# Patient Record
Sex: MALE | URBAN - METROPOLITAN AREA
[De-identification: names, ages, dates, MRNs, and addresses within clinical notes are randomized per-mention and may not be internally consistent; named-entity substitution may affect disease eponyms.]

---

## 2018-01-02 ENCOUNTER — TRANSFERRED RECORDS (OUTPATIENT)
Dept: HEALTH INFORMATION MANAGEMENT | Facility: CLINIC | Age: 23
End: 2018-01-02

## 2018-01-11 ENCOUNTER — OFFICE VISIT (OUTPATIENT)
Dept: PHARMACY | Facility: CLINIC | Age: 23
End: 2018-01-11
Payer: COMMERCIAL

## 2018-01-11 ENCOUNTER — DOCUMENTATION ONLY (OUTPATIENT)
Dept: INFECTIOUS DISEASES | Facility: CLINIC | Age: 23
End: 2018-01-11

## 2018-01-11 DIAGNOSIS — Z21 HUMAN IMMUNODEFICIENCY VIRUS I INFECTION (H): Primary | ICD-10-CM

## 2018-01-11 PROCEDURE — 99207 ZZC NO CHARGE LOS: CPT | Performed by: PHARMACIST

## 2018-01-11 NOTE — MR AVS SNAPSHOT
"              After Visit Summary   2018    Jason Gramajo    MRN: [id number]           Patient Information     Date Of Birth          1995        Visit Information        Provider Department      2018 3:30 PM Mya Maldonado RPH [facility] Infectious Diseases [facility]        Today's Diagnoses     Human immunodeficiency virus I infection (H)    -  1       Follow-ups after your visit        Who to contact     If you have questions or need follow up information about today's clinic visit or your schedule please contact [facility] AND INFECTIOUS DISEASES [facility] directly at [phone].  Normal or non-critical lab and imaging results will be communicated to you by MyChart, letter or phone within 4 business days after the clinic has received the results. If you do not hear from us within 7 days, please contact the clinic through JumpSofthart or phone. If you have a critical or abnormal lab result, we will notify you by phone as soon as possible.  Submit refill requests through Shipu or call your pharmacy and they will forward the refill request to us. Please allow 3 business days for your refill to be completed.          Additional Information About Your Visit        MyChart Information     Shipu lets you send messages to your doctor, view your test results, renew your prescriptions, schedule appointments and more. To sign up, go to [email or web address] . Click on \"Log in\" on the left side of the screen, which will take you to the Welcome page. Then click on \"Sign up Now\" on the right side of the page.     You will be asked to enter the access code listed below, as well as some personal information. Please follow the directions to create your username and password.     Your access code is: [email or web address]  Expires: 2018  6:30 AM     Your access code will  in 90 days. If you need help or a new code, please call your [place] clinic or [phone].        Care EveryWhere ID "     This is your Care EveryWhere ID. This could be used by other organizations to access your [facility] medical records  [id number]         Blood Pressure from Last 3 Encounters:   No data found for BP    Weight from Last 3 Encounters:   No data found for Wt              Today, you had the following     No orders found for display         Today's Medication Changes          These changes are accurate as of: 1/11/18 11:59 PM.  If you have any questions, ask your nurse or doctor.               Start taking these medicines.        Dose/Directions    abacavir-dolutegravir-LamiVUDine 600- MG per tablet   Commonly known as:  TRIUMEQ   Used for:  Human immunodeficiency virus I infection (H)   Started by:  Mya Maldonado RPH        Dose:  1 tablet   Take 1 tablet by mouth daily   Quantity:  30 tablet   Refills:  3       escitalopram 20 MG tablet   Commonly known as:  LEXAPRO   Started by:  Mya Maldonado RPH        Dose:  10 mg   Take 0.5 tablets (10 mg) by mouth daily   Quantity:  15 tablet   Refills:  3            Where to get your medicines      These medications were sent to [facility] [address] [place] [address] [address] [place] [id number]    Hours:  TRANSPLANT PHONE NUMBER [phone] Phone:  [phone]     abacavir-dolutegravir-LamiVUDine 600- MG per tablet    escitalopram 20 MG tablet                Primary Care Provider Office Phone # Fax #    Elaina Garcia [phone]       [facility] [address]  [facility] [address]        Equal Access to Services     JENNIFER CARLSON AH: Hadii aad ku hadasho Soomaali, waaxda luqadaha, qaybta kaalmada adeegyada, awa adams. So [facility] [phone].    ATENCIÓN: Si habla español, tiene a dennis disposición servicios gratuitos de asistencia lingüística. Llame al [phone].    We comply with applicable federal civil rights laws and [place] laws. We do not  discriminate on the basis of race, color, national origin, age, disability, sex, sexual orientation, or gender identity.            Thank you!     Thank you for choosing [facility] AND INFECTIOUS DISEASES [place]  for your care. Our goal is always to provide you with excellent care. Hearing back from our patients is one way we can continue to improve our services. Please take a few minutes to complete the written survey that you may receive in the mail after your visit with us. Thank you!             Your Updated Medication List - Protect others around you: Learn how to safely use, store and throw away your medicines at www.disposemymeds.org.          This list is accurate as of: 1/11/18 11:59 PM.  Always use your most recent med list.                   Brand Name Dispense Instructions for use Diagnosis    abacavir-dolutegravir-LamiVUDine 600- MG per tablet    TRIUMEQ    30 tablet    Take 1 tablet by mouth daily    Human immunodeficiency virus I infection (H)       escitalopram 20 MG tablet    LEXAPRO    15 tablet    Take 0.5 tablets (10 mg) by mouth daily

## 2018-01-12 RX ORDER — ESCITALOPRAM OXALATE 20 MG/1
10 TABLET ORAL DAILY
Qty: 15 TABLET | Refills: 3 | Status: SHIPPED | OUTPATIENT
Start: 2018-01-12

## 2018-01-17 NOTE — PROGRESS NOTES
Clinical Consult:                                                    Jason Gramajo is a 22 year old male coming in for a clinical pharmacist consult.  He was referred to me from Eric Ruiz. Pt arrived late to appointment, so we will not have time for a full MTM visit.     Reason for Consult: New med start.    Discussion: Jason reports he was diagnosed with HIV and wants to start medication as soon as possible. Reports Dr. Rodas prescribed Triumeq and he wants to know the best time to take it and what side effects he should be aware of.    Plan:  1. Reviewed dosing, what to do if a dose is missed, the importance of strict medication adherence, and possible side effects.  2. Pt was supplied with a key chain, pill vega.   3. Ok per Dr. Bergeron to order Triumeq once daily and Lexapo 20 mg, to take a 1/2 tablet daily, with 3 refills each.     Pt is asked to call with any questions/concerns.    Mya Maldonado, [facility] Pharmacist.   [phone]

## 2018-01-23 ENCOUNTER — TELEPHONE (OUTPATIENT)
Dept: PHARMACY | Facility: CLINIC | Age: 23
End: 2018-01-23

## 2018-01-23 RX ORDER — CHOLECALCIFEROL (VITAMIN D3) 50 MCG
2000 TABLET ORAL DAILY
Qty: 100 TABLET | Refills: 3 | COMMUNITY
Start: 2018-01-23

## 2018-01-23 NOTE — TELEPHONE ENCOUNTER
Clinical Consult:                                                    Jason Gramajo is a 22 year old male called for a clinical pharmacist consult.     Reason for Consult: Nausea symptoms    Discussion: Jason reports since starting Triumeq, he has been feeling a little nauseated. Reports he is taking Triumeq, Lexapro and Vit D all at the same time, after some food, about 5-6 pm. Reports on Friday he took meds and 15 minutes later vomited. Reports he called the pharmacy and they recommended he take another dose. Reports one missed dose since starting Triumeq on 01/12/18.    Plan:  1. Suggested he take Triumeq after food at 5-6pm and take Lexapro and Vit D at bedtime with a snack.    He is asked to call me on Friday to follow up.    Mya Maldonado, [facility] Pharmacist.   [phone]

## 2018-02-04 NOTE — PROGRESS NOTES
Mr. Jason Gramajo referred by [place] [facility] for a Social Work introduction and intake evaluation by the [facility] pharmacist today.  He lacks effective health insurance but wished to get started on antiretroviral therapy as soon as possible, so I spoke to him briefly today so he can receive a prescription with appropriate ARV initiation counseling by the [facility] pharmacist.    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  He tested previously negative for HIV infection at a [facility] clinic in 4/17 and 6/17 and had previously been on Truvada PrEP, but has not taken that for more than a year.  He was seen for another routine STD evaluation at [facility] on 12/20/17 and was told to come in for his results which he did after Rohit, at which point he learned the HIV-1 test was positive.  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  He comes from an immigrant family to who he has not come out as saavedra due to considerable stigma, so he feels he cannot discuss his new diagnosis with his family.  After his diagnosis, he was referred to a primary care physician, Dr. Jose Delaney, at [facility] who performed laboratory studies on 1/2/18 which we have received a copy of.  His initial absolute CD4+ cell count was 299 (20.5%) with an HIV plasma viral load of 13,507 copies / ml (4.13 log).  An HLA  assay was negative.  I phoned the [facility] specialty lab this afternoon and learned that an antiretroviral genotype resistance assay was performed as well, showing no  "pertinent integrase inhibitor or other class mutations (basically a wild type virus -- will be faxed to us).  Mr. Gramajo reports that he experienced \"a bad flu-(like) syndrome\" in late 7/17 - early 8/17 which lasted only a few days, but has had normal energy and has no felt sick that he recalls at all since then.  He has had several male partners over the past half year and is not aware of any of them as being particularly likely to have transmitted HIV to him.  He has done a considerable amount of internet research and conversation with individuals at [facility] over the past 1.5 weeks and would like to start antiretroviral therapy now, although was late to his appointment with the [facility] pharmacist today, so will need to return for further medication counseling with her before we are comfortable having him start.  He has taken other medications in the past and is quite certain that he has never had any difficulty adhering to dosing schedules tightly.    He has a past history of PSTD and depression for which he was previously prescribed and took for quite some time Lexapro 10 mg (half of a 20 mg pill) PO daily with good results (he tried couple of other antidepressants prior to that without much benefit) -- he has not been on that for some time, but would like to resume it now.  He also has a prior history of anxiety for which he has taken Buspar in the past, but not recently.  He would like the Lexapro to be re-started now.  Beyond those psychological diagnoses, he lacks other notable past medical history, including no history of surgeries, hospitalizations, or other significant illnesses.    Other labs from [facility] on 1/2/18:  GC / Chlamydia urine screen negative, serum anti-treponemal screen negative, hepatitis A Ab positive, hepatitis B surface antigen and antibody negative, hepatitis C virus screen negative, CMV IgG positive, Quantiferon Gold negative, hemoglobin 13.7, WBC 3.9 (ANC low at 1.5 but ALC 1.9), creatinine 0.89, " hemoglobin A1C 5.5%, and LFTs normal.  Total cholesterol was 142 with LDL 77, HDL 40, and triglycerides 126.  (Lab results scanned into Epic.)    Plan:  - He is working with the clinic  to obtain appropriate insurance to cover [facility] care.  Assuming that happens, we will schedule a formal new patient appointment with me in about one month.  - He will return to see the [facility] pharmacist within the next week for appropriate pre-ARV counseling and with that will then start taking Triumeq one tablet PO daily (preferred because of low-likelihood of interactions with any potential psychiatric medications and low likelihood of side effect risk).  He is HLA  negative, but he will also be counseled to phone us if he develops any skin, GI, or constitutional symptoms before his initial appointment with me.  - We will restart his Lexapro 10 mg (one half of a 20 mg pill) PO once daily.  - Will obtain HIV and drug toxicity laboratory studies prior to or at his initial physician's appointment with me.  Will also obtain three site STD screens and Toxoplasma IgG.  He needs repeat HBV and also HPV vaccination in the near future as well as additional safe sex counseling.

## 2018-02-05 ENCOUNTER — TELEPHONE (OUTPATIENT)
Dept: PHARMACY | Facility: CLINIC | Age: 23
End: 2018-02-05

## 2018-02-05 NOTE — TELEPHONE ENCOUNTER
Attempted to contact the patient to for refill reminder call,  left message on voicemail. Per Eric MAHONEY pt has  4 tablets left.     Follow up: 02/06/18    Farida Avila [facility]  [phone]

## 2018-03-06 ENCOUNTER — TELEPHONE (OUTPATIENT)
Dept: PHARMACY | Facility: CLINIC | Age: 23
End: 2018-03-06

## 2018-03-06 NOTE — TELEPHONE ENCOUNTER
Called patient for refill reminder.    Will mail 2 prescriptions address has been verified.     1 month of on time refill.    Follow up: 04/02/18    Farida Avila, [facility]  [phone]

## 2018-04-02 ENCOUNTER — TELEPHONE (OUTPATIENT)
Dept: PHARMACY | Facility: CLINIC | Age: 23
End: 2018-04-02

## 2018-04-02 NOTE — TELEPHONE ENCOUNTER
Attempted to contact the patient to for refill reminder call,  left message on voicemail.    Follow up:04/04/18 2nd attempt     Farida Avila, [facility]  [phone]

## 2018-04-16 ENCOUNTER — TELEPHONE (OUTPATIENT)
Dept: PHARMACY | Facility: CLINIC | Age: 23
End: 2018-04-16

## 2018-04-16 NOTE — TELEPHONE ENCOUNTER
Called patient for refill reminder.    Will  1 prescriptions address has been verified.     Follow up: 05/09/18    Farida Avila, [facility]  [phone]

## 2018-04-19 DIAGNOSIS — Z21 HIV (HUMAN IMMUNODEFICIENCY VIRUS INFECTION) (H): Primary | ICD-10-CM

## 2018-04-19 NOTE — PROGRESS NOTES
Due to insurance, pt plans to get labs drawn at [facility] lab. Pt will bring paper orders into lab. Requested in lab orders that results be faxed to Dr. Bergeron in clinic.  Roselia German RN

## 2018-05-09 ENCOUNTER — TELEPHONE (OUTPATIENT)
Dept: PHARMACY | Facility: CLINIC | Age: 23
End: 2018-05-09

## 2018-05-09 DIAGNOSIS — Z21 HUMAN IMMUNODEFICIENCY VIRUS I INFECTION (H): ICD-10-CM

## 2018-05-09 NOTE — TELEPHONE ENCOUNTER
Attempted to contact the patient  for refill reminder call,  left message on voicemail    Follow-up Date: 5/14/18    Britt Gutierrez, [facility]  [phone]

## 2018-05-09 NOTE — TELEPHONE ENCOUNTER
Jason called back for refill reminder, ok to send out 1 rx.  Will send out ` prescriptions address has been verified.     5 month of on time refill.    Follow-up Date: 6/5/18

## 2018-05-15 ENCOUNTER — TRANSFERRED RECORDS (OUTPATIENT)
Dept: HEALTH INFORMATION MANAGEMENT | Facility: CLINIC | Age: 23
End: 2018-05-15

## 2018-06-06 ENCOUNTER — TELEPHONE (OUTPATIENT)
Dept: PHARMACY | Facility: CLINIC | Age: 23
End: 2018-06-06

## 2018-06-06 NOTE — TELEPHONE ENCOUNTER
Called patient for refill reminder.    Will send out 1 prescription, new address has been verified and updated in [employer] and Epic.     4 month of on time refill.    Follow-up Date: 7/2/18

## 2018-06-08 DIAGNOSIS — Z21 HUMAN IMMUNODEFICIENCY VIRUS I INFECTION (H): ICD-10-CM

## 2018-06-13 ENCOUNTER — TELEPHONE (OUTPATIENT)
Dept: INFECTIOUS DISEASES | Facility: CLINIC | Age: 23
End: 2018-06-13

## 2018-06-14 NOTE — TELEPHONE ENCOUNTER
[facility] MD Telephone Note:  Mr. Gramajo was seen by our  on 1/11/18 and I saw him for a brief conversation at that time.  He had no insurance, but had obtained an HIV plasma viral load at another clinic of ~13,000 copies/ml.  He received a prescription for Triumeq with a plan that he would have insurance shortly.  Unfortunately, he ended up with insurance that makes [facility] out-of-network and therefore very expensive, so he instead went to [facility] (which is in-network) and had HIV labs drawn on 5/15/18.  I received those results from him and spoke with him by phone ([phone]) this PM about the results.  On Triumeq for about four months, his HIV plasma viral load was down to undetectable (< 40 copies/ml on the [facility] assay).  His absolute CD4+ cell count was 355.  His CBC and comprehensive metabolic panel chemistries were unremarkable.  He says that he is tolerating the Triumeq fairly well but does have some post-dose nausea, especially if he takes it without food.  He has missed about four doses per month over the past couple of months, due to forgetting plus a combination of his work schedule (he usually takes the dose about 1 PM, but if he forgets he has to go to work at 3PM and cannot then take it after that) and considerable recent life stress (a parent's illness and other issues).  He does not use cell phone alarms and we discussed that which he will try.  He says that he was able to take it more consistently the first three months.  We discussed the long-term rationale for consistent dosing.  He has now scheduled an initial appointment with Dr. UMM Pittman at [facility] on 7/21/18.  I told him we will expect them to prescribe his Triumeq refill (or discuss alternative antiretrovirals) at that time, since he does not after all plan to be followed here at [facility].  He had no other acute health concerns today.

## 2018-07-02 ENCOUNTER — TELEPHONE (OUTPATIENT)
Dept: PHARMACY | Facility: CLINIC | Age: 23
End: 2018-07-02

## 2018-07-02 NOTE — TELEPHONE ENCOUNTER
Called patient for refill reminder.  I SPOKE TO TEZ ABOUT HIS MONTHLY REFILL.   DUE TO SOME MISSED DOSES, HE STILL HAS MORE THAN A WEEK SUPPLY AND REQUESTED THAT WE WAIT UNTIL NEXT WEEK TO FILL.   He also mentioned that he will be changing providers but does not have an appt with his new [facility] provider until the end of July.  He is not sure if he will continue to be part of the DSP program but will for sure need his July meds from [place].    4 month of on time refill.    Follow-up Date: 7/9/18

## 2018-07-09 ENCOUNTER — TELEPHONE (OUTPATIENT)
Dept: PHARMACY | Facility: CLINIC | Age: 23
End: 2018-07-09

## 2018-07-09 NOTE — TELEPHONE ENCOUNTER
Called patient for refill reminder.    Will send out 1 prescription once the refill authorization is approved address has been verified.     4 month of on time refill.    Follow-up Date: 8/6/18    Britt Gutierrez [facility]  [phone]

## 2018-07-10 DIAGNOSIS — Z21 HUMAN IMMUNODEFICIENCY VIRUS I INFECTION (H): ICD-10-CM

## 2021-05-30 ENCOUNTER — RECORDS - HEALTHEAST (OUTPATIENT)
Dept: ADMINISTRATIVE | Facility: CLINIC | Age: 26
End: 2021-05-30